# Patient Record
Sex: FEMALE | Race: WHITE | NOT HISPANIC OR LATINO | URBAN - METROPOLITAN AREA
[De-identification: names, ages, dates, MRNs, and addresses within clinical notes are randomized per-mention and may not be internally consistent; named-entity substitution may affect disease eponyms.]

---

## 2018-09-11 ENCOUNTER — APPOINTMENT (RX ONLY)
Dept: URBAN - METROPOLITAN AREA CLINIC 77 | Facility: CLINIC | Age: 63
Setting detail: DERMATOLOGY
End: 2018-09-11

## 2018-09-11 DIAGNOSIS — L82.1 OTHER SEBORRHEIC KERATOSIS: ICD-10-CM

## 2018-09-11 DIAGNOSIS — B07.8 OTHER VIRAL WARTS: ICD-10-CM

## 2018-09-11 PROBLEM — F41.9 ANXIETY DISORDER, UNSPECIFIED: Status: ACTIVE | Noted: 2018-09-11

## 2018-09-11 PROBLEM — M12.9 ARTHROPATHY, UNSPECIFIED: Status: ACTIVE | Noted: 2018-09-11

## 2018-09-11 PROCEDURE — ? COUNSELING

## 2018-09-11 PROCEDURE — ? LIQUID NITROGEN

## 2018-09-11 PROCEDURE — 99202 OFFICE O/P NEW SF 15 MIN: CPT | Mod: 25

## 2018-09-11 PROCEDURE — 17110 DESTRUCTION B9 LES UP TO 14: CPT

## 2018-09-11 ASSESSMENT — LOCATION SIMPLE DESCRIPTION DERM
LOCATION SIMPLE: LEFT FOREARM
LOCATION SIMPLE: RIGHT CHEEK
LOCATION SIMPLE: ABDOMEN
LOCATION SIMPLE: CHEST
LOCATION SIMPLE: LEFT UPPER BACK
LOCATION SIMPLE: RIGHT FOREARM

## 2018-09-11 ASSESSMENT — LOCATION ZONE DERM
LOCATION ZONE: TRUNK
LOCATION ZONE: FACE
LOCATION ZONE: ARM

## 2018-09-11 ASSESSMENT — LOCATION DETAILED DESCRIPTION DERM
LOCATION DETAILED: LEFT VENTRAL DISTAL FOREARM
LOCATION DETAILED: LEFT DISTAL DORSAL FOREARM
LOCATION DETAILED: RIGHT DISTAL DORSAL FOREARM
LOCATION DETAILED: MIDDLE STERNUM
LOCATION DETAILED: RIGHT CENTRAL MANDIBULAR CHEEK
LOCATION DETAILED: SUBXIPHOID
LOCATION DETAILED: LEFT SUPERIOR MEDIAL UPPER BACK

## 2018-09-11 NOTE — PROCEDURE: LIQUID NITROGEN
Detail Level: Zone
Consent: The patient's consent was obtained including but not limited to risks of crusting, scabbing, blistering, scarring, darker or lighter pigmentary change, recurrence, incomplete removal and infection.
Include Z78.9 (Other Specified Conditions Influencing Health Status) As An Associated Diagnosis?: No
Medical Necessity Information: It is in your best interest to select a reason for this procedure from the list below. All of these items fulfill various CMS LCD requirements except the new and changing color options.
Post-Care Instructions: I reviewed with the patient in detail post-care instructions. Patient is to wear sunprotection, and avoid picking at any of the treated lesions. Pt may apply Vaseline to crusted or scabbing areas.
Medical Necessity Clause: This procedure was medically necessary because the lesions that were treated were:

## 2020-04-03 NOTE — HPI: EVALUATION OF SKIN LESION(S)
Hpi Title: Evaluation of Skin Lesions
How Severe Are Your Spot(S)?: mild
Have Your Spot(S) Been Treated In The Past?: has not been treated
COUGH

## 2020-05-12 ENCOUNTER — APPOINTMENT (RX ONLY)
Dept: URBAN - METROPOLITAN AREA CLINIC 77 | Facility: CLINIC | Age: 65
Setting detail: DERMATOLOGY
End: 2020-05-12

## 2020-05-12 DIAGNOSIS — D18.0 HEMANGIOMA: ICD-10-CM

## 2020-05-12 DIAGNOSIS — L91.8 OTHER HYPERTROPHIC DISORDERS OF THE SKIN: ICD-10-CM

## 2020-05-12 DIAGNOSIS — L30.1 DYSHIDROSIS [POMPHOLYX]: ICD-10-CM

## 2020-05-12 DIAGNOSIS — L82.1 OTHER SEBORRHEIC KERATOSIS: ICD-10-CM

## 2020-05-12 PROBLEM — D18.01 HEMANGIOMA OF SKIN AND SUBCUTANEOUS TISSUE: Status: ACTIVE | Noted: 2020-05-12

## 2020-05-12 PROCEDURE — ? PRESCRIPTION

## 2020-05-12 PROCEDURE — ? COUNSELING

## 2020-05-12 PROCEDURE — ? ADDITIONAL NOTES

## 2020-05-12 PROCEDURE — 99213 OFFICE O/P EST LOW 20 MIN: CPT

## 2020-05-12 RX ORDER — CLOBETASOL PROPIONATE 0.5 MG/G
CREAM TOPICAL BID
Qty: 1 | Refills: 2 | Status: ERX | COMMUNITY
Start: 2020-05-12

## 2020-05-12 RX ADMIN — CLOBETASOL PROPIONATE: 0.5 CREAM TOPICAL at 00:00

## 2020-05-12 ASSESSMENT — LOCATION SIMPLE DESCRIPTION DERM
LOCATION SIMPLE: RIGHT UPPER BACK
LOCATION SIMPLE: LEFT BREAST
LOCATION SIMPLE: LEFT UPPER BACK
LOCATION SIMPLE: RIGHT HAND
LOCATION SIMPLE: LEFT POSTERIOR THIGH
LOCATION SIMPLE: LEFT HAND

## 2020-05-12 ASSESSMENT — LOCATION DETAILED DESCRIPTION DERM
LOCATION DETAILED: LEFT DISTAL POSTERIOR THIGH
LOCATION DETAILED: LEFT THENAR EMINENCE
LOCATION DETAILED: LEFT MID-UPPER BACK
LOCATION DETAILED: RIGHT ULNAR PALM
LOCATION DETAILED: LEFT INFRAMAMMARY CREASE (INNER QUADRANT)
LOCATION DETAILED: RIGHT INFERIOR UPPER BACK

## 2020-05-12 ASSESSMENT — LOCATION ZONE DERM
LOCATION ZONE: TRUNK
LOCATION ZONE: LEG
LOCATION ZONE: HAND

## 2020-05-19 ENCOUNTER — RX ONLY (OUTPATIENT)
Age: 65
Setting detail: RX ONLY
End: 2020-05-19

## 2020-05-19 RX ORDER — CLOBETASOL PROPIONATE 0.5 MG/G
CREAM TOPICAL BID
Qty: 1 | Refills: 2 | Status: ERX

## 2022-07-07 ENCOUNTER — APPOINTMENT (RX ONLY)
Dept: URBAN - METROPOLITAN AREA CLINIC 77 | Facility: CLINIC | Age: 67
Setting detail: DERMATOLOGY
End: 2022-07-07

## 2022-07-07 DIAGNOSIS — B07.8 OTHER VIRAL WARTS: ICD-10-CM

## 2022-07-07 DIAGNOSIS — L73.8 OTHER SPECIFIED FOLLICULAR DISORDERS: ICD-10-CM

## 2022-07-07 DIAGNOSIS — D22 MELANOCYTIC NEVI: ICD-10-CM

## 2022-07-07 DIAGNOSIS — D18.0 HEMANGIOMA: ICD-10-CM

## 2022-07-07 DIAGNOSIS — L57.8 OTHER SKIN CHANGES DUE TO CHRONIC EXPOSURE TO NONIONIZING RADIATION: ICD-10-CM

## 2022-07-07 DIAGNOSIS — L259 CONTACT DERMATITIS AND OTHER ECZEMA, UNSPECIFIED CAUSE: ICD-10-CM | Status: INADEQUATELY CONTROLLED

## 2022-07-07 DIAGNOSIS — L82.1 OTHER SEBORRHEIC KERATOSIS: ICD-10-CM

## 2022-07-07 DIAGNOSIS — L81.4 OTHER MELANIN HYPERPIGMENTATION: ICD-10-CM

## 2022-07-07 PROBLEM — D18.01 HEMANGIOMA OF SKIN AND SUBCUTANEOUS TISSUE: Status: ACTIVE | Noted: 2022-07-07

## 2022-07-07 PROBLEM — L30.8 OTHER SPECIFIED DERMATITIS: Status: ACTIVE | Noted: 2022-07-07

## 2022-07-07 PROBLEM — D22.5 MELANOCYTIC NEVI OF TRUNK: Status: ACTIVE | Noted: 2022-07-07

## 2022-07-07 PROCEDURE — ? LIQUID NITROGEN

## 2022-07-07 PROCEDURE — 99214 OFFICE O/P EST MOD 30 MIN: CPT | Mod: 25

## 2022-07-07 PROCEDURE — ? PRESCRIPTION

## 2022-07-07 PROCEDURE — ? COUNSELING

## 2022-07-07 PROCEDURE — 17110 DESTRUCTION B9 LES UP TO 14: CPT

## 2022-07-07 PROCEDURE — ? FULL BODY SKIN EXAM

## 2022-07-07 PROCEDURE — ? TREATMENT REGIMEN

## 2022-07-07 PROCEDURE — ? ADDITIONAL NOTES

## 2022-07-07 RX ORDER — TRIAMCINOLONE ACETONIDE 1 MG/G
1 CREAM TOPICAL BID
Qty: 454 | Refills: 1 | Status: ERX | COMMUNITY
Start: 2022-07-07

## 2022-07-07 RX ADMIN — TRIAMCINOLONE ACETONIDE 1: 1 CREAM TOPICAL at 00:00

## 2022-07-07 ASSESSMENT — LOCATION DETAILED DESCRIPTION DERM
LOCATION DETAILED: LEFT VENTRAL PROXIMAL FOREARM
LOCATION DETAILED: RIGHT ANTERIOR DISTAL UPPER ARM
LOCATION DETAILED: LEFT INFERIOR CENTRAL MALAR CHEEK
LOCATION DETAILED: LEFT ANTERIOR DISTAL THIGH
LOCATION DETAILED: RIGHT VENTRAL PROXIMAL FOREARM
LOCATION DETAILED: RIGHT INFERIOR CENTRAL MALAR CHEEK
LOCATION DETAILED: LEFT LATERAL PROXIMAL PRETIBIAL REGION
LOCATION DETAILED: RIGHT ANTERIOR DISTAL THIGH
LOCATION DETAILED: RIGHT PROXIMAL PRETIBIAL REGION
LOCATION DETAILED: LEFT DISTAL PRETIBIAL REGION
LOCATION DETAILED: RIGHT PROXIMAL POSTERIOR UPPER ARM
LOCATION DETAILED: LEFT ANTERIOR PROXIMAL THIGH
LOCATION DETAILED: LEFT PROXIMAL PRETIBIAL REGION
LOCATION DETAILED: LEFT DISTAL MEDIAL POSTERIOR UPPER ARM
LOCATION DETAILED: EPIGASTRIC SKIN
LOCATION DETAILED: RIGHT ANTERIOR PROXIMAL THIGH
LOCATION DETAILED: LEFT PROXIMAL RADIAL DORSAL FOREARM
LOCATION DETAILED: RIGHT PROXIMAL RADIAL DORSAL FOREARM
LOCATION DETAILED: LEFT ANTERIOR DISTAL UPPER ARM
LOCATION DETAILED: INFERIOR THORACIC SPINE

## 2022-07-07 ASSESSMENT — LOCATION ZONE DERM
LOCATION ZONE: ARM
LOCATION ZONE: TRUNK
LOCATION ZONE: LEG
LOCATION ZONE: FACE

## 2022-07-07 ASSESSMENT — LOCATION SIMPLE DESCRIPTION DERM
LOCATION SIMPLE: LEFT THIGH
LOCATION SIMPLE: RIGHT FOREARM
LOCATION SIMPLE: RIGHT CHEEK
LOCATION SIMPLE: LEFT CHEEK
LOCATION SIMPLE: LEFT POSTERIOR UPPER ARM
LOCATION SIMPLE: ABDOMEN
LOCATION SIMPLE: LEFT UPPER ARM
LOCATION SIMPLE: LEFT PRETIBIAL REGION
LOCATION SIMPLE: RIGHT THIGH
LOCATION SIMPLE: RIGHT UPPER ARM
LOCATION SIMPLE: RIGHT POSTERIOR UPPER ARM
LOCATION SIMPLE: LEFT FOREARM
LOCATION SIMPLE: UPPER BACK
LOCATION SIMPLE: RIGHT PRETIBIAL REGION

## 2022-07-07 NOTE — PROCEDURE: LIQUID NITROGEN
Spray Paint Text: The liquid nitrogen was applied to the skin utilizing a spray paint frosting technique.
Consent: The patient's consent was obtained including but not limited to risks of crusting, scabbing, blistering, scarring, darker or lighter pigmentary change, recurrence, incomplete removal and infection.
Show Topical Anesthesia Variable?: Yes
Render Post-Care Instructions In Note?: no
Medical Necessity Information: It is in your best interest to select a reason for this procedure from the list below. All of these items fulfill various CMS LCD requirements except the new and changing color options.
Medical Necessity Clause: This procedure was medically necessary because the lesions that were treated were:
Post-Care Instructions: I reviewed with the patient in detail post-care instructions. Patient is to wear sunprotection, and avoid picking at any of the treated lesions. Pt may apply Vaseline to crusted or scabbing areas.
Detail Level: Zone

## 2023-10-06 ENCOUNTER — APPOINTMENT (RX ONLY)
Dept: URBAN - METROPOLITAN AREA CLINIC 168 | Facility: CLINIC | Age: 68
Setting detail: DERMATOLOGY
End: 2023-10-06

## 2023-10-06 DIAGNOSIS — L82.1 OTHER SEBORRHEIC KERATOSIS: ICD-10-CM | Status: STABLE

## 2023-10-06 DIAGNOSIS — L73.8 OTHER SPECIFIED FOLLICULAR DISORDERS: ICD-10-CM | Status: STABLE

## 2023-10-06 DIAGNOSIS — D22 MELANOCYTIC NEVI: ICD-10-CM | Status: STABLE

## 2023-10-06 DIAGNOSIS — L82.0 INFLAMED SEBORRHEIC KERATOSIS: ICD-10-CM | Status: INADEQUATELY CONTROLLED

## 2023-10-06 DIAGNOSIS — D18.0 HEMANGIOMA: ICD-10-CM | Status: STABLE

## 2023-10-06 DIAGNOSIS — L81.8 OTHER SPECIFIED DISORDERS OF PIGMENTATION: ICD-10-CM | Status: STABLE

## 2023-10-06 DIAGNOSIS — L81.4 OTHER MELANIN HYPERPIGMENTATION: ICD-10-CM | Status: STABLE

## 2023-10-06 DIAGNOSIS — L57.8 OTHER SKIN CHANGES DUE TO CHRONIC EXPOSURE TO NONIONIZING RADIATION: ICD-10-CM

## 2023-10-06 PROBLEM — D18.01 HEMANGIOMA OF SKIN AND SUBCUTANEOUS TISSUE: Status: ACTIVE | Noted: 2023-10-06

## 2023-10-06 PROBLEM — D23.5 OTHER BENIGN NEOPLASM OF SKIN OF TRUNK: Status: ACTIVE | Noted: 2023-10-06

## 2023-10-06 PROBLEM — D22.5 MELANOCYTIC NEVI OF TRUNK: Status: ACTIVE | Noted: 2023-10-06

## 2023-10-06 PROCEDURE — ? ADDITIONAL NOTES

## 2023-10-06 PROCEDURE — 17110 DESTRUCTION B9 LES UP TO 14: CPT

## 2023-10-06 PROCEDURE — ? LIQUID NITROGEN

## 2023-10-06 PROCEDURE — ? COUNSELING

## 2023-10-06 PROCEDURE — 99213 OFFICE O/P EST LOW 20 MIN: CPT | Mod: 25

## 2023-10-06 ASSESSMENT — LOCATION DETAILED DESCRIPTION DERM
LOCATION DETAILED: RIGHT PROXIMAL DORSAL FOREARM
LOCATION DETAILED: RIGHT INFERIOR UPPER BACK
LOCATION DETAILED: LEFT DISTAL DORSAL FOREARM
LOCATION DETAILED: RIGHT CENTRAL BUCCAL CHEEK
LOCATION DETAILED: RIGHT ANTERIOR MEDIAL DISTAL THIGH
LOCATION DETAILED: LEFT LATERAL ABDOMEN
LOCATION DETAILED: PERIUMBILICAL SKIN
LOCATION DETAILED: RIGHT SUPERIOR MEDIAL MIDBACK
LOCATION DETAILED: LEFT CENTRAL BUCCAL CHEEK
LOCATION DETAILED: LEFT PROXIMAL DORSAL FOREARM
LOCATION DETAILED: RIGHT SUPERIOR FOREHEAD

## 2023-10-06 ASSESSMENT — LOCATION SIMPLE DESCRIPTION DERM
LOCATION SIMPLE: ABDOMEN
LOCATION SIMPLE: RIGHT UPPER BACK
LOCATION SIMPLE: RIGHT LOWER BACK
LOCATION SIMPLE: RIGHT FOREARM
LOCATION SIMPLE: RIGHT THIGH
LOCATION SIMPLE: LEFT CHEEK
LOCATION SIMPLE: LEFT FOREARM
LOCATION SIMPLE: RIGHT FOREHEAD
LOCATION SIMPLE: RIGHT CHEEK

## 2023-10-06 ASSESSMENT — LOCATION ZONE DERM
LOCATION ZONE: LEG
LOCATION ZONE: FACE
LOCATION ZONE: ARM
LOCATION ZONE: TRUNK

## 2023-10-06 NOTE — PROCEDURE: ADDITIONAL NOTES
Detail Level: Simple
Additional Notes: Refer to Dr. Forbes for consultation for filler and Botox of face as well as Nd:Yag laser of lentigines on hands and face.
Render Risk Assessment In Note?: no

## 2023-10-06 NOTE — PROCEDURE: LIQUID NITROGEN
Post-Care Instructions: I reviewed with the patient in detail post-care instructions. Patient is to wear sunprotection, and avoid picking at any of the treated lesions. Pt may apply Vaseline to crusted or scabbing areas.
Show Aperture Variable?: Yes
Spray Paint Technique: No
Medical Necessity Information: It is in your best interest to select a reason for this procedure from the list below. All of these items fulfill various CMS LCD requirements except the new and changing color options.
Spray Paint Text: The liquid nitrogen was applied to the skin utilizing a spray paint frosting technique.
Medical Necessity Clause: This procedure was medically necessary because the lesions that were treated were:
Detail Level: Detailed
Consent: The patient's consent was obtained including but not limited to risks of crusting, scabbing, blistering, scarring, darker or lighter pigmentary change, recurrence, incomplete removal and infection.

## 2023-10-06 NOTE — PROCEDURE: COUNSELING
Detail Level: Generalized
Detail Level: Detailed
Detail Level: Simple
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 Can take time to clear but current 81mg daily Aspirin may allow condition to progress.
